# Patient Record
Sex: FEMALE | Race: WHITE | Employment: FULL TIME | URBAN - METROPOLITAN AREA
[De-identification: names, ages, dates, MRNs, and addresses within clinical notes are randomized per-mention and may not be internally consistent; named-entity substitution may affect disease eponyms.]

---

## 2019-10-10 LAB
HBV SURFACE AG SERPL QL IA: NEGATIVE
HIV 1+2 AB+HIV1 P24 AG SERPL QL IA: NEGATIVE
RUBELLA ABY IGG: NORMAL
TREPONEMA ANTIBODIES: NEGATIVE

## 2019-11-29 ENCOUNTER — HOSPITAL ENCOUNTER (INPATIENT)
Facility: CLINIC | Age: 34
LOS: 7 days | Discharge: HOME | End: 2019-12-06
Attending: OBSTETRICS & GYNECOLOGY | Admitting: OBSTETRICS & GYNECOLOGY
Payer: COMMERCIAL

## 2019-11-29 ENCOUNTER — APPOINTMENT (OUTPATIENT)
Dept: ULTRASOUND IMAGING | Facility: CLINIC | Age: 34
End: 2019-11-29
Attending: OBSTETRICS & GYNECOLOGY
Payer: COMMERCIAL

## 2019-11-29 PROBLEM — Z36.89 ENCOUNTER FOR TRIAGE IN PREGNANT PATIENT: Status: ACTIVE | Noted: 2019-11-29

## 2019-11-29 LAB
BASOPHILS # BLD AUTO: 0 10E9/L (ref 0–0.2)
BASOPHILS NFR BLD AUTO: 0.1 %
DIFFERENTIAL METHOD BLD: ABNORMAL
EOSINOPHIL # BLD AUTO: 0.2 10E9/L (ref 0–0.7)
EOSINOPHIL NFR BLD AUTO: 1.1 %
ERYTHROCYTE [DISTWIDTH] IN BLOOD BY AUTOMATED COUNT: 13.3 % (ref 10–15)
HCT VFR BLD AUTO: 37.1 % (ref 35–47)
HGB BLD-MCNC: 12.7 G/DL (ref 11.7–15.7)
IMM GRANULOCYTES # BLD: 0 10E9/L (ref 0–0.4)
IMM GRANULOCYTES NFR BLD: 0.3 %
LYMPHOCYTES # BLD AUTO: 2.8 10E9/L (ref 0.8–5.3)
LYMPHOCYTES NFR BLD AUTO: 20.9 %
MCH RBC QN AUTO: 31.9 PG (ref 26.5–33)
MCHC RBC AUTO-ENTMCNC: 34.2 G/DL (ref 31.5–36.5)
MCV RBC AUTO: 93 FL (ref 78–100)
MONOCYTES # BLD AUTO: 0.9 10E9/L (ref 0–1.3)
MONOCYTES NFR BLD AUTO: 6.6 %
NEUTROPHILS # BLD AUTO: 9.6 10E9/L (ref 1.6–8.3)
NEUTROPHILS NFR BLD AUTO: 71 %
NRBC # BLD AUTO: 0 10*3/UL
NRBC BLD AUTO-RTO: 0 /100
PLATELET # BLD AUTO: 129 10E9/L (ref 150–450)
RBC # BLD AUTO: 3.98 10E12/L (ref 3.8–5.2)
RUPTURE OF FETAL MEMBRANES BY ROM PLUS: POSITIVE
WBC # BLD AUTO: 13.6 10E9/L (ref 4–11)

## 2019-11-29 PROCEDURE — 86850 RBC ANTIBODY SCREEN: CPT | Performed by: OBSTETRICS & GYNECOLOGY

## 2019-11-29 PROCEDURE — 76815 OB US LIMITED FETUS(S): CPT

## 2019-11-29 PROCEDURE — 86901 BLOOD TYPING SEROLOGIC RH(D): CPT | Performed by: OBSTETRICS & GYNECOLOGY

## 2019-11-29 PROCEDURE — 25000128 H RX IP 250 OP 636

## 2019-11-29 PROCEDURE — 86870 RBC ANTIBODY IDENTIFICATION: CPT | Performed by: OBSTETRICS & GYNECOLOGY

## 2019-11-29 PROCEDURE — 85025 COMPLETE CBC W/AUTO DIFF WBC: CPT | Performed by: OBSTETRICS & GYNECOLOGY

## 2019-11-29 PROCEDURE — 84112 EVAL AMNIOTIC FLUID PROTEIN: CPT | Performed by: OBSTETRICS & GYNECOLOGY

## 2019-11-29 PROCEDURE — 86900 BLOOD TYPING SEROLOGIC ABO: CPT | Performed by: OBSTETRICS & GYNECOLOGY

## 2019-11-29 PROCEDURE — G0463 HOSPITAL OUTPT CLINIC VISIT: HCPCS

## 2019-11-29 PROCEDURE — 12000000 ZZH R&B MED SURG/OB

## 2019-11-29 PROCEDURE — 87653 STREP B DNA AMP PROBE: CPT | Performed by: OBSTETRICS & GYNECOLOGY

## 2019-11-29 PROCEDURE — 36415 COLL VENOUS BLD VENIPUNCTURE: CPT | Performed by: OBSTETRICS & GYNECOLOGY

## 2019-11-29 RX ORDER — ACETAMINOPHEN 325 MG/1
975 TABLET ORAL EVERY 4 HOURS PRN
Status: DISCONTINUED | OUTPATIENT
Start: 2019-11-29 | End: 2019-12-06 | Stop reason: HOSPADM

## 2019-11-29 RX ORDER — MAGNESIUM SULFATE IN WATER 40 MG/ML
1 INJECTION, SOLUTION INTRAVENOUS CONTINUOUS
Status: DISCONTINUED | OUTPATIENT
Start: 2019-11-29 | End: 2019-12-06 | Stop reason: HOSPADM

## 2019-11-29 RX ORDER — ASPIRIN 81 MG/1
81 TABLET, CHEWABLE ORAL DAILY
Status: ON HOLD | COMMUNITY
End: 2019-12-06

## 2019-11-29 RX ORDER — VITAMIN A ACETATE, .BETA.-CAROTENE, ASCORBIC ACID, CHOLECALCIFEROL, .ALPHA.-TOCOPHEROL ACETATE, DL-, THIAMINE MONONITRATE, RIBOFLAVIN, NIACINAMIDE, PYRIDOXINE HYDROCHLORIDE, FOLIC ACID, CYANOCOBALAMIN, CALCIUM CARBONATE, FERROUS FUMARATE, ZINC OXIDE, AND CUPRIC OXIDE 2000; 2000; 120; 400; 22; 1.84; 3; 20; 10; 1; 12; 200; 27; 25; 2 [IU]/1; [IU]/1; MG/1; [IU]/1; MG/1; MG/1; MG/1; MG/1; MG/1; MG/1; UG/1; MG/1; MG/1; MG/1; MG/1
1 TABLET ORAL DAILY
COMMUNITY

## 2019-11-29 RX ORDER — BETAMETHASONE SODIUM PHOSPHATE AND BETAMETHASONE ACETATE 3; 3 MG/ML; MG/ML
INJECTION, SUSPENSION INTRA-ARTICULAR; INTRALESIONAL; INTRAMUSCULAR; SOFT TISSUE
Status: COMPLETED
Start: 2019-11-29 | End: 2019-11-29

## 2019-11-29 RX ORDER — MAGNESIUM SULFATE HEPTAHYDRATE 40 MG/ML
4 INJECTION, SOLUTION INTRAVENOUS ONCE
Status: COMPLETED | OUTPATIENT
Start: 2019-11-29 | End: 2019-11-30

## 2019-11-29 RX ORDER — ONDANSETRON 2 MG/ML
4 INJECTION INTRAMUSCULAR; INTRAVENOUS EVERY 6 HOURS PRN
Status: DISCONTINUED | OUTPATIENT
Start: 2019-11-29 | End: 2019-12-06 | Stop reason: HOSPADM

## 2019-11-29 RX ORDER — BETAMETHASONE SODIUM PHOSPHATE AND BETAMETHASONE ACETATE 3; 3 MG/ML; MG/ML
12 INJECTION, SUSPENSION INTRA-ARTICULAR; INTRALESIONAL; INTRAMUSCULAR; SOFT TISSUE EVERY 24 HOURS
Status: COMPLETED | OUTPATIENT
Start: 2019-11-29 | End: 2019-11-30

## 2019-11-29 RX ORDER — AMOXICILLIN 250 MG
2 CAPSULE ORAL 2 TIMES DAILY
Status: DISCONTINUED | OUTPATIENT
Start: 2019-11-29 | End: 2019-12-06 | Stop reason: HOSPADM

## 2019-11-29 RX ORDER — AMPICILLIN 2 G/1
2 INJECTION, POWDER, FOR SOLUTION INTRAVENOUS EVERY 6 HOURS
Status: COMPLETED | OUTPATIENT
Start: 2019-11-29 | End: 2019-12-01

## 2019-11-29 RX ORDER — AZITHROMYCIN 250 MG/1
1000 TABLET, FILM COATED ORAL ONCE
Status: COMPLETED | OUTPATIENT
Start: 2019-11-29 | End: 2019-11-30

## 2019-11-29 RX ORDER — CALCIUM GLUCONATE 94 MG/ML
1 INJECTION, SOLUTION INTRAVENOUS
Status: DISCONTINUED | OUTPATIENT
Start: 2019-11-29 | End: 2019-12-06 | Stop reason: HOSPADM

## 2019-11-29 RX ORDER — AMOXICILLIN 250 MG
1 CAPSULE ORAL 2 TIMES DAILY
Status: DISCONTINUED | OUTPATIENT
Start: 2019-11-29 | End: 2019-12-06 | Stop reason: HOSPADM

## 2019-11-29 RX ORDER — SODIUM CHLORIDE, SODIUM LACTATE, POTASSIUM CHLORIDE, CALCIUM CHLORIDE 600; 310; 30; 20 MG/100ML; MG/100ML; MG/100ML; MG/100ML
INJECTION, SOLUTION INTRAVENOUS CONTINUOUS
Status: DISCONTINUED | OUTPATIENT
Start: 2019-11-29 | End: 2019-12-06 | Stop reason: HOSPADM

## 2019-11-29 RX ORDER — AMOXICILLIN 250 MG/1
250 CAPSULE ORAL 3 TIMES DAILY
Status: DISCONTINUED | OUTPATIENT
Start: 2019-12-01 | End: 2019-12-04

## 2019-11-29 RX ADMIN — BETAMETHASONE SODIUM PHOSPHATE AND BETAMETHASONE ACETATE 12 MG: 3; 3 INJECTION, SUSPENSION INTRA-ARTICULAR; INTRALESIONAL; INTRAMUSCULAR at 23:27

## 2019-11-29 ASSESSMENT — ACTIVITIES OF DAILY LIVING (ADL)
AMBULATION: 0-->INDEPENDENT
BATHING: 0-->INDEPENDENT
TOILETING: 0-->INDEPENDENT
SWALLOWING: 0-->SWALLOWS FOODS/LIQUIDS WITHOUT DIFFICULTY
TRANSFERRING: 0-->INDEPENDENT
RETIRED_COMMUNICATION: 0-->UNDERSTANDS/COMMUNICATES WITHOUT DIFFICULTY
RETIRED_EATING: 0-->INDEPENDENT
DRESS: 0-->INDEPENDENT
FALL_HISTORY_WITHIN_LAST_SIX_MONTHS: NO
COGNITION: 0 - NO COGNITION ISSUES REPORTED

## 2019-11-29 ASSESSMENT — MIFFLIN-ST. JEOR: SCORE: 1762.3

## 2019-11-30 LAB
ABO + RH BLD: ABNORMAL
ABO + RH BLD: ABNORMAL
ALBUMIN UR-MCNC: NEGATIVE MG/DL
AMPHETAMINES UR QL SCN: NEGATIVE
APPEARANCE UR: CLEAR
BILIRUB UR QL STRIP: NEGATIVE
BLD GP AB INVEST PLASRBC-IMP: ABNORMAL
BLD GP AB SCN SERPL QL: ABNORMAL
BLOOD BANK CMNT PATIENT-IMP: ABNORMAL
CANNABINOIDS UR QL: NEGATIVE
COCAINE UR QL: NEGATIVE
COLOR UR AUTO: ABNORMAL
GLUCOSE UR STRIP-MCNC: NEGATIVE MG/DL
HGB UR QL STRIP: ABNORMAL
KETONES UR STRIP-MCNC: 40 MG/DL
LEUKOCYTE ESTERASE UR QL STRIP: ABNORMAL
MUCOUS THREADS #/AREA URNS LPF: PRESENT /LPF
NITRATE UR QL: NEGATIVE
OPIATES UR QL SCN: NEGATIVE
PCP UR QL SCN: NEGATIVE
PH UR STRIP: 6 PH (ref 5–7)
RBC #/AREA URNS AUTO: 28 /HPF (ref 0–2)
SOURCE: ABNORMAL
SP GR UR STRIP: 1.01 (ref 1–1.03)
SPECIMEN EXP DATE BLD: ABNORMAL
SQUAMOUS #/AREA URNS AUTO: 1 /HPF (ref 0–1)
UROBILINOGEN UR STRIP-MCNC: NORMAL MG/DL (ref 0–2)
WBC #/AREA URNS AUTO: 20 /HPF (ref 0–5)

## 2019-11-30 PROCEDURE — 25000128 H RX IP 250 OP 636: Performed by: OBSTETRICS & GYNECOLOGY

## 2019-11-30 PROCEDURE — 25800030 ZZH RX IP 258 OP 636: Performed by: OBSTETRICS & GYNECOLOGY

## 2019-11-30 PROCEDURE — 81001 URINALYSIS AUTO W/SCOPE: CPT | Performed by: OBSTETRICS & GYNECOLOGY

## 2019-11-30 PROCEDURE — 25000132 ZZH RX MED GY IP 250 OP 250 PS 637: Performed by: OBSTETRICS & GYNECOLOGY

## 2019-11-30 PROCEDURE — 12000000 ZZH R&B MED SURG/OB

## 2019-11-30 PROCEDURE — 87086 URINE CULTURE/COLONY COUNT: CPT | Performed by: OBSTETRICS & GYNECOLOGY

## 2019-11-30 PROCEDURE — 80307 DRUG TEST PRSMV CHEM ANLYZR: CPT | Performed by: OBSTETRICS & GYNECOLOGY

## 2019-11-30 RX ADMIN — AMPICILLIN SODIUM 2 G: 2 INJECTION, POWDER, FOR SOLUTION INTRAVENOUS at 00:19

## 2019-11-30 RX ADMIN — ACETAMINOPHEN 975 MG: 325 TABLET, FILM COATED ORAL at 10:20

## 2019-11-30 RX ADMIN — AMPICILLIN SODIUM 2 G: 2 INJECTION, POWDER, FOR SOLUTION INTRAVENOUS at 23:51

## 2019-11-30 RX ADMIN — AMPICILLIN SODIUM 2 G: 2 INJECTION, POWDER, FOR SOLUTION INTRAVENOUS at 05:58

## 2019-11-30 RX ADMIN — AMPICILLIN SODIUM 2 G: 2 INJECTION, POWDER, FOR SOLUTION INTRAVENOUS at 18:02

## 2019-11-30 RX ADMIN — MAGNESIUM SULFATE IN WATER 2 G/HR: 40 INJECTION, SOLUTION INTRAVENOUS at 11:12

## 2019-11-30 RX ADMIN — MAGNESIUM SULFATE IN WATER 2 G/HR: 40 INJECTION, SOLUTION INTRAVENOUS at 20:50

## 2019-11-30 RX ADMIN — SODIUM CHLORIDE, POTASSIUM CHLORIDE, SODIUM LACTATE AND CALCIUM CHLORIDE: 600; 310; 30; 20 INJECTION, SOLUTION INTRAVENOUS at 14:04

## 2019-11-30 RX ADMIN — MAGNESIUM SULFATE IN WATER 2 G/HR: 40 INJECTION, SOLUTION INTRAVENOUS at 01:04

## 2019-11-30 RX ADMIN — BETAMETHASONE SODIUM PHOSPHATE AND BETAMETHASONE ACETATE 6 MG: 3; 3 INJECTION, SUSPENSION INTRA-ARTICULAR; INTRALESIONAL; INTRAMUSCULAR at 23:50

## 2019-11-30 RX ADMIN — ONDANSETRON 4 MG: 2 INJECTION INTRAMUSCULAR; INTRAVENOUS at 18:06

## 2019-11-30 RX ADMIN — SODIUM CHLORIDE, POTASSIUM CHLORIDE, SODIUM LACTATE AND CALCIUM CHLORIDE: 600; 310; 30; 20 INJECTION, SOLUTION INTRAVENOUS at 00:18

## 2019-11-30 RX ADMIN — SENNOSIDES AND DOCUSATE SODIUM 1 TABLET: 8.6; 5 TABLET ORAL at 10:20

## 2019-11-30 RX ADMIN — AZITHROMYCIN MONOHYDRATE 1000 MG: 250 TABLET ORAL at 00:24

## 2019-11-30 RX ADMIN — AMPICILLIN SODIUM 2 G: 2 INJECTION, POWDER, FOR SOLUTION INTRAVENOUS at 11:59

## 2019-11-30 RX ADMIN — MAGNESIUM SULFATE HEPTAHYDRATE 4 G: 40 INJECTION, SOLUTION INTRAVENOUS at 00:35

## 2019-11-30 NOTE — PLAN OF CARE
Patient states she feels some  cramping most not picked up on monitor. VS stable on Mag Sulfate for neuro protection, On IVAB for PPROM protocol. PCDs on , Patient in good spirits. Lungs clear. Occ c/o headache, had a dose of tylenol this AM. Continue plan of care.

## 2019-11-30 NOTE — PROVIDER NOTIFICATION
11/30/19 1400   Comments   Comments Patient states she has some right side pain like she did with a previous ovarian cyst.

## 2019-11-30 NOTE — PROVIDER NOTIFICATION
11/29/19 0750   Provider Notification   Provider Name/Title Dr. Hood   Method of Notification Electronic Page   Notification Reason SVE;Other (Comment)  (ultrasound position cephalic)       Electronic page per Dr. Hood request with results from Ultrasound for presentation and SVE.  No response expected.

## 2019-11-30 NOTE — PROVIDER NOTIFICATION
11/29/19 1755   Provider Notification   Provider Name/Title Dr. Hood   Method of Notification Phone   Request Evaluate - Remote   Notification Reason Lab/Diagnostic Study;Status Update              Dr. Hood informed of but not limited to patient prenatal/medical history, ROMplus positive, difficulty with monitoring, occasional cramping. Plan per provider is admit to labor and delivery.  Give Betamethasone, Magnesium for neuro protection, Antibiotics for PPROM, GBS swab, US for presentation, and SVE. Patient verbalized agreement with plan.

## 2019-11-30 NOTE — PROVIDER NOTIFICATION
11/30/19 1230   Comments   Comments Patient stated she is feeling continual rectal and vaginal pressure, SVE done prior to letting patient get up to bathroom.

## 2019-11-30 NOTE — H&P
"  2019    Dasha Rebolledo  [id number]    OB Admit History & Physical      Ms. Rebolledo  is here with leakage of fluid    She noticed a big gush of fluid yesterday evening around 8:30 PM.    No LMP recorded. Patient is pregnant.   Her Estimated Date of Delivery: 2020, making her 32w5d  wks.      Estimated body mass index is 42.69 kg/m  as calculated from the following:    Height as of this encounter: 1.6 m (5' 3\").    Weight as of this encounter: 109.3 kg (241 lb).     Dasha is a 35 y/o  at 32w5d gestation by 27w3d ultrasound, who presented to L&D yesterday evening due to multiple large gushes of clear fluid beginning around 8:30 PM. She also complains of mild cramping. She denies vaginal bleeding (aside from a slight pink tinge in the fluid.)    She is visiting from [place] for the holiday weekend.    Her prenatal course has been complicated by:     -Late prenatal care at 27 weeks gestation (she didn't realize she was pregnant until ~25 weeks)   -A history of PPROM/PTL at 33 weeks with her first pregnancy in . This was a forceps assisted vaginal delivery. She states she pushed for 12 hours though the details are unclear.   -D&E at 23 weeks gestation for her second pregnancy in  for severe fetal anomalies (anhydramnios and polycystic vs dysplastic kidneys).      -She also reports a history of thrombocytopenia of unclear etiology, for which her platelets have been trended during her pregnancy.     -Maternal obesity (BMI 42).    -History of anxiety/depression for which she stopped taking sertraline in Aug 2019 and reports her mood is stable    See scanned outside records for prenatals    She had a normal fetal anatomy scan/ level II US at 27w3d gestation    GBS unknown, Rh negative, rubella immune. She received Rhogam on 19.    She is a 34 year old   Her OB history:   OB History    Para Term  AB Living   3 1 0 1 1 1   SAB TAB Ectopic Multiple Live Births   0 1 0 0 1    "   # Outcome Date GA Lbr Jason/2nd Weight Sex Delivery Anes PTL Lv   3 Current            2          JANNIE   1 TAB                 Past Medical History:   Diagnosis Date     Depressive disorder      Migraines        Past Surgical History:   Procedure Laterality Date     AS REMOVAL OF TONSILS,12+ Y/O       DILATION AND CURETTAGE         No current outpatient medications on file.       Allergies: Venlafaxine      REVIEW OF SYSTEMS:  NEUROLOGIC:  Negative  EYES:  Negative  ENT:  Negative  GI:  Negative  BREAST:  Negative  :  Negative  GYN:  Negative  CV:  Negative  PULMONARY:  Negative  MUSCULOSKELETAL:  Negative  PSYCH:  Negative        Social History     Socioeconomic History     Marital status:      Spouse name: Not on file     Number of children: Not on file     Years of education: Not on file     Highest education level: Not on file   Occupational History     Not on file   Social Needs     Financial resource strain: Not on file     Food insecurity:     Worry: Not on file     Inability: Not on file     Transportation needs:     Medical: Not on file     Non-medical: Not on file   Tobacco Use     Smoking status: Never Smoker     Smokeless tobacco: Never Used   Substance and Sexual Activity     Alcohol use: Not Currently     Drug use: Never     Sexual activity: Yes     Partners: Male     Birth control/protection: None   Lifestyle     Physical activity:     Days per week: Not on file     Minutes per session: Not on file     Stress: Not on file   Relationships     Social connections:     Talks on phone: Not on file     Gets together: Not on file     Attends Jewish service: Not on file     Active member of club or organization: Not on file     Attends meetings of clubs or organizations: Not on file     Relationship status: Not on file     Intimate partner violence:     Fear of current or ex partner: Not on file     Emotionally abused: Not on file     Physically abused: Not on file     Forced sexual  activity: Not on file   Other Topics Concern     Not on file   Social History Narrative     Not on file      History reviewed. No pertinent family history.      Vitals:     Vitals:    11/30/19 0745 11/30/19 0800 11/30/19 0830 11/30/19 0900   BP: 134/76      Resp:       Temp: 98.3  F (36.8  C)      TempSrc: Oral      SpO2: 100% 100% 100% 100%   Weight:       Height:           /mod/+accels/no decels  [place]: not tracing any contractions  SVE: on admission: 1/50/-3    Alert Awake in NAD  ABD gravid, obese, non-tender  Pelvic: Deferred at this time  Ext: NT/NE  Bedside ultrasound on admission confirmed vertex presentation    Component      Latest Ref Rng & Units 11/29/2019 11/30/2019   WBC      4.0 - 11.0 10e9/L 13.6 (H)    RBC Count      3.8 - 5.2 10e12/L 3.98    Hemoglobin      11.7 - 15.7 g/dL 12.7    Hematocrit      35.0 - 47.0 % 37.1    MCV      78 - 100 fl 93    MCH      26.5 - 33.0 pg 31.9    MCHC      31.5 - 36.5 g/dL 34.2    RDW      10.0 - 15.0 % 13.3    Platelet Count      150 - 450 10e9/L 129 (L)    Diff Method       Automated Method    % Neutrophils      % 71.0    % Lymphocytes      % 20.9    % Monocytes      % 6.6    % Eosinophils      % 1.1    % Basophils      % 0.1    % Immature Granulocytes      % 0.3    Nucleated RBCs      0 /100 0    Absolute Neutrophil      1.6 - 8.3 10e9/L 9.6 (H)    Absolute Lymphocytes      0.8 - 5.3 10e9/L 2.8    Absolute Monocytes      0.0 - 1.3 10e9/L 0.9    Absolute Eosinophils      0.0 - 0.7 10e9/L 0.2    Absolute Basophils      0.0 - 0.2 10e9/L 0.0    Abs Immature Granulocytes      0 - 0.4 10e9/L 0.0    Absolute Nucleated RBC       0.0    Amphetamine Qual Urine      NEG:Negative  Negative   Cannabinoids Qual Urine      NEG:Negative  Negative   Cocaine Qual Urine      NEG:Negative  Negative   Opiates Qualitative Urine      NEG:Negative  Negative   Pcp Qual Urine      NEG:Negative  Negative   Rupture of Fetal Membranes by ROM Plus      NEG:Negative Positive (A)         A/P: 35 y/o  at 32w5d by 27w3d US, admitted with PPROM    Fetal well being - reassuring fetal monitoring at this time  Will plan for continuous monitoring for first 24 hours of admission, then can switch to TID if remains reassuring  -BMTZ for fetal lung maturity - 1st dose at  at 23:30, second dose due tonight    PPROM  -On latency antibiotics     -Received 1 dose of azithromycin, currently on IV ampicillin     -Continue antibiotics per protocol  -Monitor for signs of labor or infection  -Plan for delivery at 34 weeks or sooner if indicated  -GBS pending  -UA pending  -Urine drug screen negative  -On magnesium for tocolysis while administering BTMZ  -NICU consult    Thrombocytopenia - platelets 129 on admission, stable from prior (were last 128 per patient report)    Rh negative - received Rhogam on . Rubella immune.    Pain - will want epidural when laboring    Dispo - admit to L&D until delivery      Priti Wu MD  Dept of OB/GYN  2019

## 2019-11-30 NOTE — PROVIDER NOTIFICATION
11/30/19 0700   Uterine Activity Assessment   Method external tocotransducer   Contraction Frequency (Minutes) none   Contraction Intensity Patient reports cramping   Uterine Resting Tone soft by palpation       Patient reports cramping fairly continuous for the last 15 minutes, rating it a 2-3 or like a heavy menstrual cramp

## 2019-11-30 NOTE — PLAN OF CARE
Data: [facility] to [facility] at [facility]. Pt is visiting family in [place] lives in [place].  Provider Dr. Mcnally at [facility] in [facility].  Reason for maternal/fetal assessment per patient is Fluid Leak  Patient is a . Prenatal record reviewed.   Gestational Age 32w4d. VSS. Fetal movement present. Patient denies  backache, vaginal discharge, pelvic pressure, UTI symptoms, GI problems, bloody show, vaginal bleeding, edema, headache, visual disturbances, epigastric or URQ pain, abdominal pain.  Rupture of membranes at 2030 clear fluid.  Patient noting some cramping.  Pt has history of termination at 23 weeks and delivery at 33 weeks due to PPROM.  Late prenatal care this pregnancy.  Support family present.  Action: Verbal consent for EFM. Triage assessment completed. EFM applied for fetal wellbeing during fluid leak. Uterine assessment soft and non tender to touch. ROMplus collected and sent to lab.  RICHARDSON sent to [facility] for record of prenatal history.  Response: Awaiting ROMplus results to update provider for walk-ins

## 2019-11-30 NOTE — PLAN OF CARE
Patient started on antibiotics for prom and Mag for neuro protection.  Patient reports some mild cramping in lower abdomen to back about every 10-15 minutes.  Abdomen palpates soft and RN could not detect any tightening while palpating during cramping.  Fluid continues to leak, pink tinged. Patient remains afebrile throughout evening.

## 2019-12-01 LAB
BACTERIA SPEC CULT: NO GROWTH
GP B STREP DNA SPEC QL NAA+PROBE: NEGATIVE
Lab: NORMAL
MAGNESIUM SERPL-MCNC: 5.6 MG/DL (ref 1.6–2.3)
SPECIMEN SOURCE: NORMAL
SPECIMEN SOURCE: NORMAL

## 2019-12-01 PROCEDURE — 12000000 ZZH R&B MED SURG/OB

## 2019-12-01 PROCEDURE — 36415 COLL VENOUS BLD VENIPUNCTURE: CPT | Performed by: OBSTETRICS & GYNECOLOGY

## 2019-12-01 PROCEDURE — 25000132 ZZH RX MED GY IP 250 OP 250 PS 637: Performed by: OBSTETRICS & GYNECOLOGY

## 2019-12-01 PROCEDURE — 25800030 ZZH RX IP 258 OP 636: Performed by: OBSTETRICS & GYNECOLOGY

## 2019-12-01 PROCEDURE — 25000128 H RX IP 250 OP 636: Performed by: OBSTETRICS & GYNECOLOGY

## 2019-12-01 PROCEDURE — 83735 ASSAY OF MAGNESIUM: CPT | Performed by: OBSTETRICS & GYNECOLOGY

## 2019-12-01 RX ADMIN — ACETAMINOPHEN 975 MG: 325 TABLET, FILM COATED ORAL at 17:29

## 2019-12-01 RX ADMIN — SODIUM CHLORIDE, POTASSIUM CHLORIDE, SODIUM LACTATE AND CALCIUM CHLORIDE: 600; 310; 30; 20 INJECTION, SOLUTION INTRAVENOUS at 02:17

## 2019-12-01 RX ADMIN — ONDANSETRON 4 MG: 2 INJECTION INTRAMUSCULAR; INTRAVENOUS at 19:26

## 2019-12-01 RX ADMIN — SENNOSIDES AND DOCUSATE SODIUM 1 TABLET: 8.6; 5 TABLET ORAL at 20:53

## 2019-12-01 RX ADMIN — AMPICILLIN SODIUM 2 G: 2 INJECTION, POWDER, FOR SOLUTION INTRAVENOUS at 06:18

## 2019-12-01 RX ADMIN — SODIUM CHLORIDE, POTASSIUM CHLORIDE, SODIUM LACTATE AND CALCIUM CHLORIDE: 600; 310; 30; 20 INJECTION, SOLUTION INTRAVENOUS at 11:58

## 2019-12-01 RX ADMIN — MAGNESIUM SULFATE IN WATER 2 G/HR: 40 INJECTION, SOLUTION INTRAVENOUS at 06:18

## 2019-12-01 RX ADMIN — SENNOSIDES AND DOCUSATE SODIUM 1 TABLET: 8.6; 5 TABLET ORAL at 09:27

## 2019-12-01 RX ADMIN — AMOXICILLIN 250 MG: 250 CAPSULE ORAL at 23:32

## 2019-12-01 RX ADMIN — AMPICILLIN SODIUM 2 G: 2 INJECTION, POWDER, FOR SOLUTION INTRAVENOUS at 11:58

## 2019-12-01 RX ADMIN — AMPICILLIN SODIUM 2 G: 2 INJECTION, POWDER, FOR SOLUTION INTRAVENOUS at 18:10

## 2019-12-01 NOTE — PLAN OF CARE
"Pt reports uterine cramping is unchanged from previous, reports intermittent cramping every 15-20 minutes or so. Reports + fetal movement, and reports continuous \"flows\" of fluid. Clear fluid noted to pad. Pt agrees to alert RN if changes noted to cramping, frequency or intensity. Plan to resume TID monitoring in morning if unchanged throughout night. Will continue to monitor.  "

## 2019-12-01 NOTE — PLAN OF CARE
Afebrile , C/O shortness of breath this AM, mag level drawn, Mag decreased to 1g hour per MD order. Shortness of breath has resolved. Continues to leak small amounts of fluid, no vaginal bleeding. Cramping a small amt off and on this shift.  with moderate variability and accels. Mag to be discontinued tonight. Patient has 1 more IVAB dose prior to switching to po antibiotics. Plan for [facility] BPP and consult tomorrow.

## 2019-12-01 NOTE — PROGRESS NOTES
"OB  Patient is 33 yo  @ 32 6/7 weeks with PPROM since . She is comfortable this am-noting some chest pressure that has improved since decreasing Mg. Patient has now received 2 doses BMZ, last dose last night 1130pm.   BP (!) 141/73   Pulse 90   Temp 97.6  F (36.4  C) (Temporal)   Resp 18   Ht 1.6 m (5' 3\")   Wt 109.3 kg (241 lb)   SpO2 97%   BMI 42.69 kg/m       Cx exam deferred  NST reassuring for gestational age    A/P: 33 yo  @ 32 6/7 weeks, PPROM  Continue magnesium sulfate until tonight after 24 hours BMZ completed.   Continue current antibiotics. GBS is negative.  Plan for ultrasound tomorrow for growth and BPP with MFM consult.  Patient stating that she has history of low platelets with previous pregnancies, will monitor every few days.   Reviewed with patient that she will remain hospitalized until delivery. Will monitor for signs/sx of chorio and/or labor.   Consider induction of labor at 34 weeks if undelivered.     Nishi Hood MD, MD on 2019 at 10:22 AM  "

## 2019-12-01 NOTE — PLAN OF CARE
Pt reports being able to sleep since last trip to bathroom. Denies cramping, reports continued leaking with pink tinge noted to pad. Pt agrees to vital signs after returning from restroom, plans for continued rest. Will continue to monitor.

## 2019-12-01 NOTE — PLAN OF CARE
Patient c/o feeling short of breath, Patient in a high fowlers position working on computer. Denies headache, blurred vision, etc. Reflexes +2, neg clonus. Heart rate regular, lungs sound clear. Magnesium level ordered.

## 2019-12-01 NOTE — PLAN OF CARE
VSS and afebrile throughout night. Pt reports cramping throughout night that is unchanged, lower abdominal cramping rated 2/10 on pain scale. Reports 1 contraction between hours of [id number] that awoke her from sleep but that it was a single contraction. NST performed, FHR baseline 115 with moderate variability, accels and no decels. Pt reports being able to rest and sleep between bathroom trips. +fetal movement felt throughout night. Continues to leak clear with pink tinged fluid. Voiding every 1-3 hours. Will continue to monitor.

## 2019-12-01 NOTE — PLAN OF CARE
Report received from JACEK Lea RN. Pt care assumed. POC discussed with RN and pt at bedside, pt declines having further questions.

## 2019-12-02 ENCOUNTER — HOSPITAL ENCOUNTER (INPATIENT)
Dept: ULTRASOUND IMAGING | Facility: CLINIC | Age: 34
End: 2019-12-02
Attending: OBSTETRICS & GYNECOLOGY
Payer: COMMERCIAL

## 2019-12-02 PROCEDURE — 76811 OB US DETAILED SNGL FETUS: CPT

## 2019-12-02 PROCEDURE — 25000132 ZZH RX MED GY IP 250 OP 250 PS 637: Performed by: OBSTETRICS & GYNECOLOGY

## 2019-12-02 PROCEDURE — 76819 FETAL BIOPHYS PROFIL W/O NST: CPT | Performed by: OBSTETRICS & GYNECOLOGY

## 2019-12-02 PROCEDURE — 12000000 ZZH R&B MED SURG/OB

## 2019-12-02 RX ADMIN — ACETAMINOPHEN 975 MG: 325 TABLET, FILM COATED ORAL at 08:43

## 2019-12-02 RX ADMIN — AMOXICILLIN 250 MG: 250 CAPSULE ORAL at 21:18

## 2019-12-02 RX ADMIN — AMOXICILLIN 250 MG: 250 CAPSULE ORAL at 16:01

## 2019-12-02 RX ADMIN — AMOXICILLIN 250 MG: 250 CAPSULE ORAL at 08:44

## 2019-12-02 RX ADMIN — SENNOSIDES AND DOCUSATE SODIUM 1 TABLET: 8.6; 5 TABLET ORAL at 21:18

## 2019-12-02 RX ADMIN — SENNOSIDES AND DOCUSATE SODIUM 1 TABLET: 8.6; 5 TABLET ORAL at 08:44

## 2019-12-02 ASSESSMENT — MIFFLIN-ST. JEOR: SCORE: 1752.32

## 2019-12-02 NOTE — PLAN OF CARE
Pt afebrile. Denies contractions. Still leaking occasionally. Pt stated she had a headache. 975mg Tylenol given. Not much relief, so Pt given ice pack. Last dose of IV antibiotics. See MAR for details.

## 2019-12-02 NOTE — PLAN OF CARE
VS stable, Patient continues to leak clear to light pinkish amniotic fluid. Now off Mag. Has had 2 doses of betamethasone. BPP and consult per M, still plan to delivery at 34 weeks unless goes into labor on own. Patient afebrile, FHR with moderate variability and accels. No decels noted.

## 2019-12-02 NOTE — CONSULTS
"RE: Dasha Rebolledo  : 1985  MRUN: [id number]    2019    Dear Erwin Rizvi,    Thank you for referring your patient Ms. Rebolledo for a Maternal-Fetal Medicine consultation today regarding PPROM.  As you know, she is a 34 year old  at 33 weeks and 0 days gestation with an estimated date of delivery of 2020 by 27 week ultrasound.    She is from the [facility] and is here in [place] visiting family for Denis.  She reports that this pregnancy was uncomplicated until three days ago (the day after Justus) when she thought she urinated on herself and then started gushing fluid.  She said it was \"like a movie.\"  She had some pink discharge, but no bleeding.   She presented to [facility] and was diagnosed with PPROM via laboratory testing.  Since being at [facility] she was administered betamethasone and is receiving latency antibiotics.  She does report that her one hour GCT was abnormal, she thinks it may have been 180, but she has not done a three hour GTT.      Today she feels well.  She denies contractions and vaginal bleeding.  She reports good fetal movement.   She denies fevers and chills.  Only clear leakage.      Of note, she has a history of PPROM in her 2012 pregnancy and presented too late in this pregnancy to start 17-OHP.  She is unsure of the exact gestational age at that delivery.  She says that her due date kept changing.  At first it was 3, then it changed to 3 and then she was told 3/23.  Her delivery date was , which would make her 35 & 2/7 by the first АННА, 36 & 4 by the second АННА and 34 & 3 by the third АННА.  Her son weighed 6 lbs 6 oz and only stayed in the hospital for four days.  She has irregular cycles, only getting a few periods a year.  I assume the first АННА was by her LMP and the second АННА would be the most accurate, so she was likely 36 & 4.  She was 1 cm on admission and soon after was 8 cm dilated.  She delivered within 12 hours of " being at the hospital, but did have maternal exhaustion (and what sounds like a prolonged second stage) in the second stage and had a forceps-assisted delivery.  This pregnancy she did have mild gestational thrombocytopenia, but denies thrombocytopenia outside of pregnancy.  She was able to get an epidural without issue.  She denies a cervical laceration or issue related to the forceps.      She also has a history of a D&E in 2016 around 23 weeks in the setting of anhydramnios and a suspected renal anomaly.  She reports that testing for polycystic kidney disease was negative.  Further records are not available.      Obstetrical History:    # Outcome Date GA Lbr Jason/2nd Weight Sex Delivery Anes PTL Lv   3 Current            2 TAB 2018 23w0d             Complications: Anhydramnios, Fetal renal anomaly, single gestation   1  12 36w4d  2.892 kg (6 lb 6 oz) M Vag-Forceps EPI  JANNIE      Complications: History of  premature rupture of membranes (PPROM), Maternal exhaustion complicating labor and delivery     Gynecological History:    Menarche: Irregular cycles, only every few months    She denies a history of sexually transmitted infections or abnormal Pap tests.    Medical History:   Diagnosis Date     Anxiety      Depressive disorder - previously on sertraline, currently on nothing      Migraines - previously on topiramate, currently on nothing      Obesity      Thrombocytopenia - gestational      Surgical History:   Procedure Laterality Date     AS REMOVAL OF TONSILS,12+ Y/O       DILATION AND EVACUATION  2016     FOOT SURGERY Right     removal of cyst     Medications:      acetaminophen (TYLENOL) tablet 975 mg, 975 mg, Oral, Q4H PRN, 975 mg at 19 0843     amoxicillin (AMOXIL) capsule 250 mg, 250 mg, Oral, TID, 250 mg at 19 1601     lactated ringers infusion, , Intravenous, Continuous, Last Rate: 100 mL/hr at 19 2033     ondansetron (ZOFRAN) injection 4 mg, 4 mg, Intravenous, Q6H  PRN, 4 mg at 19 192     senna-docusate (SENOKOT-S/PERICOLACE) 8.6-50 MG per tablet 1 tablet, 1 tablet, Oral, BID, 1 tablet at 19 0844 **OR** senna-docusate (SENOKOT-S/PERICOLACE) 8.6-50 MG per tablet 2 tablet, 2 tablet, Oral, BID    Allergies:   Allergen Reactions     Venlafaxine Itching     Social History:    She  reports that she has never smoked. She has never used smokeless tobacco. She reports previous alcohol use. She reports that she does not use drugs.    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       Partner History:    She denies that he has a family history of motor/intellectual impairment, stillbirth, genetic or chromosome abnormalities or congenital anomalies.       Review of Systems:    Negative except as per the HPI    Data Reviewed:      Pre-pregnancy - Height 5 feet 3 inches; Weight: 254 lbs; BMI:45 kg/m2    2019  o ABO Group: O, Rh type: neg, antibody screen: positive (thought to be passive due to Rh immune globulin)  o WBC 13.6  thou/ L, Hemoglobin 12.7 mg/dL, hematocrit 37.1 %, MCV 93 fL, platelets 129 thou/ L  o ROM plus: positive  o GBS: negative  o Urine toxicology: negative  o Urine culture: no growth    The remaining prenatal laboratory results are not available for the review during the consultation.    Ultrasound:    Please see imaging tab for [facility] ultrasound performed today    Physical examination was deferred at this time.    In light of the patient s history as listed above my recommendations can be summarized briefly as follows:    Current PPROM    Today we discussed the diagnosis and various etiologies of PPROM, its potential complications and the recommended management.  A number of etiologies lead to  premature rupture of the membranes and   birth, including but not limited to infection, bleeding, uterine distension, cervical insufficiency and stress.   However, most spontaneous  deliveries occur in patients with no risk factors.  A history of prior  delivery is a significant risk factor for recurrence in a subsequent pregnancy.  Recurrent  delivery has been linked to maternal ethnicity, genitourinary infection, and especially gestational age at the first  delivery: the earlier the delivery, the greater the likelihood of recurrence.  Tobacco is a significant risk factor for PPROM/PTB.  Dasha's risk factor was her prior  birth.  We reviewed that the complications of PPROM include spontaneous  labor/birth, development (or presence of) chorioamnionitis, also referred to as intra-amniotic infection (IAI), placental abruption and cord prolapse.  We discussed the increased frequency of malpresentation and abnormal fetal testing.  We reviewed that, for all of the above reasons, in patient hospitalization until delivery is recommended.  We discussed some of the risks of prematurity.   We discussed that in the absence of evidence of an IAI, or non-reassuring fetal or maternal status, the goal for delivery is 34 weeks as this is the gestational age where it is thought that the risk of infection (in prolonged pregnancies) outweighs the risk of prematurity.  We discussed that development of IAI is an indication for immediate delivery irrespective of gestational age because of the high rate of maternal and fetal/ complications with prolonged pregnancy and the lack of available treatment.  We discussed that abruption can be insidious or acute and may prompt immediate delivery.  We also discussed that cord prolapse is a surgical emergency.  We discussed the role of latency antibiotics to prolong pregnancy.  We discussed the benefits of steroid administration in reducing some of the risks of prematurity. The  indication for neuroprotection magnesium sulfate in pregnancies is only for those at risk for delivery at <32 weeks, she is beyond this gestational age and therefore this is nor indicated.     Recommendations:    Hospitalization until delivery    Goal delivery at 34 weeks in the absence of indications for earlier delivery as outlined above    Latency antibiotics with IV ampicillin and erythromycin (or ampicillin and PO azithromycin) followed by PO amoxicillin for a total of 7 days    Immediate delivery is indicated for concern for chorioamnionitis, with administration of broad spectrum antibiotics (often ampicillin and gentamycin until delivery with addition of clindamycin if the patient requires ).  Treatment for 24 hours postoperative is recommended post- assuming the patient remains afebrile    Serial fetal monitoring with NST and contraction monitoring    We will perform another BPP in three days    Avoidance of digital cervical exams as much as possible in the setting of ruptured membranes to prevent infection    PPROM is not an indication for  per say and mode of delivery is per obstetric indication, would recheck presentation prior to induction    Neonatology and anesthesiology consultation    In the next pregnancy, the patient is a candidate for 17-hydroxyprogesterone caproate 250mg IM weekly from 16-36 weeks  gestation as well as transvaginal cervical length at 16-18 weeks  gestation with measurements every 2 weeks until 24 weeks, although she notes she is not planning any more children and is planning surgical sterilization for her or her partner    Discontinue IVF    Repeat platelet count today (has been three days).  If stable can recheck on day of delivery.  If decreasing it is reasonable to repeat again in three days    All women with a BMI of 30 or greater should be considered for pharmacologic thromboprophylaxis if they have an expected antepartum stay of 72 hours or more.    She should also have pharmacologic thromboprophylaxis within 24 hours of delivery     Encourage breastfeeding    Follow four times daily finger sticks (fasting and either one or two hours post-prandial).  Goals are <95 fasting and <140 at one hour post-prandial and/or <120 at two hours post-prandial    Rh immune globulin, if indicated, after delivery    Social work consultation    Early postpartum visit (at one week) for mood check     At the end of our discussion, Ms. Rebolledo indicated that her questions were answered and she seemed satisfied with our discussion.  Thank you for the opportunity to participate in your patient s care.  If I can be of any further assistance, please do not hesitate to contact me.    Sincerely,    Bianca Moore MD  , OB/GYN  Maternal-Fetal Medicine  [email or web address]  [phone] (Academic office)  [phone] (Pager)       I spent a total of 30 minutes face to face with Dasha Rebolledo during today's consultation.  Over 50% of this time was spent in counseling the patient and/or coordinating care.

## 2019-12-02 NOTE — PLAN OF CARE
Pt vitals stable with no temp present. No evidence of chorio. Pt feeling nauseated.  Reported tylenol that was given for her head ache has improved. Zofran given for nausea. Pt reports feeling fetal movement and movement ascultated on monitor during NST and that nausea improved. Magnesium turned off at 2330. Oral antibiotics started. Pt denies any side effects at this time. Voiding adequately. Sat and discussed with p atient her feeling overwhelmed but pt states is in good spirits. Feels confined d/t being on bedrest in room for so long. Hopeful once off mag could be up more. Tucked pt into bed.

## 2019-12-02 NOTE — PROGRESS NOTES
"HD 4  33 weeks  EDC January 20    Doing well  Good FM  Some LOF continues  No vaginal bleeding  No painful contractions     Afebrile  Normotensive    Fetal tracing ~0600 today:  Reactive NST  toco quiet    A/p    33 week para 0201  History of ~35.5-36.5 week vaginal delivery (2012)(6lb6oz baby, who was discharged from hospital on day 4 of life)  History of 23 week D&E (anomalous fetus)(2016)    PPROM November 29 PM; fetus in vertex presentation    EDC as above; established by US October 24 (perinatology, [facility]); pt was late in her presentation for prenatal care    Pt stable  She has received corticosteroids for fetal lung maturity  Magnesium sulfate course complete  Now on oral antibiotics (latency)    Disc \"endpoint\" (induction):  34 weeks (December 9)  Until that time, disc possible need for intervention if:   -intraamniotic infection occurs   -maternal or fetal status becomes unstable   -spontaneous labor ensues  Also disc risks of abruptio placentae, fetal demise (with PPROM)    Awaiting MFM input later today    Pt comfortable with keyana Cash MD  "

## 2019-12-03 ENCOUNTER — ANESTHESIA (OUTPATIENT)
Dept: OBGYN | Facility: CLINIC | Age: 34
End: 2019-12-03

## 2019-12-03 ENCOUNTER — ANESTHESIA EVENT (OUTPATIENT)
Dept: OBGYN | Facility: CLINIC | Age: 34
End: 2019-12-03

## 2019-12-03 LAB
GLUCOSE BLDC GLUCOMTR-MCNC: 109 MG/DL (ref 70–99)
GLUCOSE BLDC GLUCOMTR-MCNC: 73 MG/DL (ref 70–99)
PLATELET # BLD AUTO: 145 10E9/L (ref 150–450)

## 2019-12-03 PROCEDURE — 00000146 ZZHCL STATISTIC GLUCOSE BY METER IP

## 2019-12-03 PROCEDURE — 36415 COLL VENOUS BLD VENIPUNCTURE: CPT | Performed by: OBSTETRICS & GYNECOLOGY

## 2019-12-03 PROCEDURE — 85049 AUTOMATED PLATELET COUNT: CPT | Performed by: OBSTETRICS & GYNECOLOGY

## 2019-12-03 PROCEDURE — 12000000 ZZH R&B MED SURG/OB

## 2019-12-03 PROCEDURE — 25000132 ZZH RX MED GY IP 250 OP 250 PS 637: Performed by: OBSTETRICS & GYNECOLOGY

## 2019-12-03 RX ADMIN — AMOXICILLIN 250 MG: 250 CAPSULE ORAL at 08:00

## 2019-12-03 RX ADMIN — AMOXICILLIN 250 MG: 250 CAPSULE ORAL at 23:43

## 2019-12-03 RX ADMIN — AMOXICILLIN 250 MG: 250 CAPSULE ORAL at 19:33

## 2019-12-03 RX ADMIN — SENNOSIDES AND DOCUSATE SODIUM 1 TABLET: 8.6; 5 TABLET ORAL at 19:33

## 2019-12-03 RX ADMIN — SENNOSIDES AND DOCUSATE SODIUM 1 TABLET: 8.6; 5 TABLET ORAL at 08:00

## 2019-12-03 NOTE — PROVIDER NOTIFICATION
12/02/19 1815   Provider Notification   Provider Name/Title Dr. Marley   Method of Notification In Department   Notification Reason Status Update

## 2019-12-03 NOTE — PLAN OF CARE
VSS on RA, pt afebrile. Denies uterine ctx, bleeding, HA, blurry vision, epigastric pain. Leaking small amount of clear fluid. IV SL. Pt on bedrest with bathroom priv, voiding without difficulty. NST reactive. SCDs on. Resting comfortably through the night. Will continue to monitor per POC.

## 2019-12-03 NOTE — PLAN OF CARE
Dr Wu here to see patient. Plan to do fasting blood sugars and 2 hours post prandials going forward. Will also get a platelet count today. BP with regular sized blue cuff 155/ 82. Rechecked BP with large cuff and it is 141/ 67. No swelling, no headache, no visual disturbances reported.  1400- Pt reports a very large gush of clear fluid.  1430-blood sugar-73

## 2019-12-03 NOTE — PROGRESS NOTES
S: Feeling well today. Ongoing leakage of clear fluid. Occasional mild cramping. No vaginal bleeding. Reports good fetal movement. Denies headaches, blurred vision, RUQ pain.    O: Temp: 98.6  F (37  C) Temp src: Oral BP: (!) 141/67 Pulse: 80 Heart Rate: 83 Resp: 16 SpO2: 95 %        Gen: NAD  Abd: Soft, no fundal tenderness, gravid  Ext: NT/NE  FHT: 125/mod/+accels/no decels  [place]: none    A/P: 33 y/o  at 33w1d by 27w3d US admitted with PPROM    Fetal well being - Reassuring on TID NSTs  Growth US yesterday EFW 42%ile (4 lb 8 oz), cephalic, BPP     -Per MFM plan for repeat BPP in 2 days  Received BMTZ on  and     PPROM - stable on latency antibiotics  Continue to monitor for signs of labor, infection, abruption, fetal distress  Otherwise plan for delivery at 34 weeks    Elevated 1 hr gtt, has not completed 3 hr  Will start checking fasting and 2 hr post-prandial sugars (possible these may be elevated 2/2 recent steroid administration)    Thrombocytopenia - platelets 129 on admission; will repeat today    Elevated blood pressure - intermittent mild range blood pressures, no known history of chronic hypertension or gestational hypertension. Will continue to monitor    Rh neg - Rhogam received on 19. Rubella immune. GBS neg    Stable on antepartum    Priti Wu MD 19 1:23 PM

## 2019-12-04 LAB
BLOOD BANK CMNT PATIENT-IMP: NORMAL
BLOOD BANK CMNT PATIENT-IMP: NORMAL
GLUCOSE BLDC GLUCOMTR-MCNC: 84 MG/DL (ref 70–99)
T PALLIDUM AB SER QL: NONREACTIVE

## 2019-12-04 PROCEDURE — 36415 COLL VENOUS BLD VENIPUNCTURE: CPT | Performed by: OBSTETRICS & GYNECOLOGY

## 2019-12-04 PROCEDURE — 88307 TISSUE EXAM BY PATHOLOGIST: CPT | Mod: 26 | Performed by: OBSTETRICS & GYNECOLOGY

## 2019-12-04 PROCEDURE — 0KQM0ZZ REPAIR PERINEUM MUSCLE, OPEN APPROACH: ICD-10-PCS | Performed by: OBSTETRICS & GYNECOLOGY

## 2019-12-04 PROCEDURE — 12000035 ZZH R&B POSTPARTUM

## 2019-12-04 PROCEDURE — 25000125 ZZHC RX 250: Performed by: OBSTETRICS & GYNECOLOGY

## 2019-12-04 PROCEDURE — 00000146 ZZHCL STATISTIC GLUCOSE BY METER IP

## 2019-12-04 PROCEDURE — 25000132 ZZH RX MED GY IP 250 OP 250 PS 637: Performed by: OBSTETRICS & GYNECOLOGY

## 2019-12-04 PROCEDURE — 88307 TISSUE EXAM BY PATHOLOGIST: CPT | Performed by: OBSTETRICS & GYNECOLOGY

## 2019-12-04 PROCEDURE — 86780 TREPONEMA PALLIDUM: CPT | Performed by: OBSTETRICS & GYNECOLOGY

## 2019-12-04 PROCEDURE — 72200001 ZZH LABOR CARE VAGINAL DELIVERY SINGLE

## 2019-12-04 RX ORDER — ACETAMINOPHEN 325 MG/1
650 TABLET ORAL EVERY 4 HOURS PRN
Status: DISCONTINUED | OUTPATIENT
Start: 2019-12-04 | End: 2019-12-06 | Stop reason: HOSPADM

## 2019-12-04 RX ORDER — NALOXONE HYDROCHLORIDE 0.4 MG/ML
.1-.4 INJECTION, SOLUTION INTRAMUSCULAR; INTRAVENOUS; SUBCUTANEOUS
Status: DISCONTINUED | OUTPATIENT
Start: 2019-12-04 | End: 2019-12-06 | Stop reason: HOSPADM

## 2019-12-04 RX ORDER — CARBOPROST TROMETHAMINE 250 UG/ML
250 INJECTION, SOLUTION INTRAMUSCULAR
Status: DISCONTINUED | OUTPATIENT
Start: 2019-12-04 | End: 2019-12-06 | Stop reason: HOSPADM

## 2019-12-04 RX ORDER — IBUPROFEN 400 MG/1
800 TABLET, FILM COATED ORAL
Status: DISCONTINUED | OUTPATIENT
Start: 2019-12-04 | End: 2019-12-06 | Stop reason: HOSPADM

## 2019-12-04 RX ORDER — LANOLIN 100 %
OINTMENT (GRAM) TOPICAL
Status: DISCONTINUED | OUTPATIENT
Start: 2019-12-04 | End: 2019-12-06 | Stop reason: HOSPADM

## 2019-12-04 RX ORDER — SODIUM CHLORIDE, SODIUM LACTATE, POTASSIUM CHLORIDE, CALCIUM CHLORIDE 600; 310; 30; 20 MG/100ML; MG/100ML; MG/100ML; MG/100ML
INJECTION, SOLUTION INTRAVENOUS CONTINUOUS
Status: DISCONTINUED | OUTPATIENT
Start: 2019-12-04 | End: 2019-12-06 | Stop reason: HOSPADM

## 2019-12-04 RX ORDER — AMOXICILLIN 250 MG
1 CAPSULE ORAL 2 TIMES DAILY
Status: DISCONTINUED | OUTPATIENT
Start: 2019-12-04 | End: 2019-12-04

## 2019-12-04 RX ORDER — OXYTOCIN/0.9 % SODIUM CHLORIDE 30/500 ML
100 PLASTIC BAG, INJECTION (ML) INTRAVENOUS CONTINUOUS
Status: DISCONTINUED | OUTPATIENT
Start: 2019-12-04 | End: 2019-12-06 | Stop reason: HOSPADM

## 2019-12-04 RX ORDER — METHYLERGONOVINE MALEATE 0.2 MG/ML
200 INJECTION INTRAVENOUS
Status: DISCONTINUED | OUTPATIENT
Start: 2019-12-04 | End: 2019-12-06 | Stop reason: HOSPADM

## 2019-12-04 RX ORDER — OXYTOCIN/0.9 % SODIUM CHLORIDE 30/500 ML
340 PLASTIC BAG, INJECTION (ML) INTRAVENOUS CONTINUOUS PRN
Status: DISCONTINUED | OUTPATIENT
Start: 2019-12-04 | End: 2019-12-06 | Stop reason: HOSPADM

## 2019-12-04 RX ORDER — OXYCODONE AND ACETAMINOPHEN 5; 325 MG/1; MG/1
1 TABLET ORAL
Status: DISCONTINUED | OUTPATIENT
Start: 2019-12-04 | End: 2019-12-06 | Stop reason: HOSPADM

## 2019-12-04 RX ORDER — HYDROCORTISONE 2.5 %
CREAM (GRAM) TOPICAL 3 TIMES DAILY PRN
Status: DISCONTINUED | OUTPATIENT
Start: 2019-12-04 | End: 2019-12-06 | Stop reason: HOSPADM

## 2019-12-04 RX ORDER — IBUPROFEN 400 MG/1
800 TABLET, FILM COATED ORAL EVERY 6 HOURS PRN
Status: DISCONTINUED | OUTPATIENT
Start: 2019-12-04 | End: 2019-12-06 | Stop reason: HOSPADM

## 2019-12-04 RX ORDER — BISACODYL 10 MG
10 SUPPOSITORY, RECTAL RECTAL DAILY PRN
Status: DISCONTINUED | OUTPATIENT
Start: 2019-12-06 | End: 2019-12-06 | Stop reason: HOSPADM

## 2019-12-04 RX ORDER — AMOXICILLIN 250 MG
2 CAPSULE ORAL 2 TIMES DAILY
Status: DISCONTINUED | OUTPATIENT
Start: 2019-12-04 | End: 2019-12-04

## 2019-12-04 RX ORDER — OXYTOCIN 10 [USP'U]/ML
10 INJECTION, SOLUTION INTRAMUSCULAR; INTRAVENOUS
Status: DISCONTINUED | OUTPATIENT
Start: 2019-12-04 | End: 2019-12-06 | Stop reason: HOSPADM

## 2019-12-04 RX ORDER — ONDANSETRON 2 MG/ML
4 INJECTION INTRAMUSCULAR; INTRAVENOUS EVERY 6 HOURS PRN
Status: DISCONTINUED | OUTPATIENT
Start: 2019-12-04 | End: 2019-12-06 | Stop reason: HOSPADM

## 2019-12-04 RX ORDER — OXYTOCIN/0.9 % SODIUM CHLORIDE 30/500 ML
100-340 PLASTIC BAG, INJECTION (ML) INTRAVENOUS CONTINUOUS PRN
Status: COMPLETED | OUTPATIENT
Start: 2019-12-04 | End: 2019-12-04

## 2019-12-04 RX ADMIN — IBUPROFEN 800 MG: 400 TABLET ORAL at 05:26

## 2019-12-04 RX ADMIN — Medication 340 ML/HR: at 04:37

## 2019-12-04 RX ADMIN — IBUPROFEN 800 MG: 400 TABLET ORAL at 18:09

## 2019-12-04 RX ADMIN — ACETAMINOPHEN 650 MG: 325 TABLET, FILM COATED ORAL at 12:00

## 2019-12-04 RX ADMIN — ACETAMINOPHEN 650 MG: 325 TABLET, FILM COATED ORAL at 05:26

## 2019-12-04 RX ADMIN — IBUPROFEN 800 MG: 400 TABLET ORAL at 12:00

## 2019-12-04 RX ADMIN — ACETAMINOPHEN 650 MG: 325 TABLET, FILM COATED ORAL at 18:09

## 2019-12-04 RX ADMIN — LIDOCAINE HYDROCHLORIDE 20 ML: 10 INJECTION, SOLUTION INFILTRATION; PERINEURAL at 04:39

## 2019-12-04 NOTE — PLAN OF CARE
Patient called RN @0215 reporting light pink discharge and woke up to feeling contractions. Pt put on monitor for one hour, 4 contractions in the hour ranging from 4 to 12 minutes, reporting pain 5/10. Patient encouraged to call RN if contractions increased in frequency.     @0345 patient reporting contractions increase in frequency and intensity. SVE 7/90/-1. Dr. Schwab called for delivery and Dr. Li (in house) called for immediate assessment. Dr. Schwab at bedside, SVE 10cm by physician, pt instructed to push.     Spontaneous vaginal delivery @0434. Dr. Schwab, RN, and NNP at bedside for delivery, see delivery summary for details. IV pitocin infusing.

## 2019-12-04 NOTE — PROCEDURES
OB Vaginal Delivery Note    Dasha Rebolledo MRN# [id number]   Age: 34 year old YOB: 1985       GA: 33w2d  GP:   Labor Complications:PPROM at 32 weeks 5 day  Prolonged PROM (>18 hours)   EBL: 50  mL  Delivery QBL:    Delivery Type: Vaginal, Spontaneous   ROM to Delivery Time: (Delivered) Days: 4 Hours: 8 Minutes: 4   Weight:      1 Minute 5 Minute 10 Minute   Apgar Totals:                     Delivery Details:  Dasha Rebolledo, a 34 year old  female delivered a vigorous female infant delivered in vertex  left  occiput  anterior  position. Anterior and posterior shoulders delivered without difficulty. The cord was clamped, cut twice and    3 vessels were noted. Cord blood was obtained in routine fashion.    Cord complications: none.  Placenta delivered spontaneously and intact. Placental disposition was Pathology . Fundal massage performed and fundus found to be firm.     Episiotomy: none    Perineum, vagina, cervix were inspected, and the following lacerations were noted:   Perineal lacerations: 2nd                     Any lacerations were repaired in the usual fashion using 3-0 vicryl    Excellent hemostasis was noted. Needle count correct. Infant and patient in delivery room in good and stable condition.        Rupture date/time: 19   Fluid color:  Clear, Pink, Other (comment)  Fluid odor:  Normal     Delivery/Placenta Date and Time    Delivery Date:  19 Delivery Time:   4:34 AM      Labor Events and Shoulder Dystocia    Fetal Tracing Prior to Delivery:  Category 1  Shoulder dystocia present?:  Neg             Delivery (Maternal) (Provider to Complete) (135849)    Episiotomy:  None  Perineal lacerations:  2nd Repaired?:  Yes   Est. blood loss (mL):  50  Number of repair packets:  1     Blood Loss  Mother: Dasha Rebolledo #[id number]   Start of Mother's Information    IO Blood Loss  19 1634 - 19 0456    EBL (mL) Hospital Encounter 50 mL    Total  50 mL         End  of Mother's Information  Mother: Dasha Rebolledo #[id number]         Delivery - Provider to Complete ([id number])    Delivering clinician:  Schwab, Jennifer Lani, MD  Attempted Delivery Types (Choose all that apply):  Spontaneous Vaginal Delivery  Delivery Type (Choose the 1 that will go to the Birth History):  Vaginal, Spontaneous          Placenta    Delayed Cord Clamping:  Done  Removal:  Spontaneous  Disposition:  Pathology     Presentation and Position    Presentation:  Vertex  Position:  Left Occiput Anterior           Jennifer L. Schwab, MD

## 2019-12-04 NOTE — LACTATION NOTE
Initial visit with Dasha.  Baby in NICU.  Baby  33+2 gestation.     Breastfeeding general information reviewed.   Advised to pump and hand express 8-12x/day. When able to hold baby, encouraged lots of skin to skin. Explained benefits of holding and skin to skin. Instructed on hand expression. Patient is from [place] and will seek out  when home.  [facility] resource phone numbers given.  Has a breast pump for home.  Questions answered regarding pumping and physiology of milk supply and production.  Dasha thanked  for all the help and information.    Continues to nurse well per mom. No further questions at this time.   Will follow as needed.   Fatuma CARTERN, RN, PHN, RNC-MNN, IBCLC

## 2019-12-04 NOTE — PROVIDER NOTIFICATION
12/04/19 0406   Provider Notification   Provider Name/Title Dr. Schwab   Method of Notification Phone   Request Attend Delivery   Notification Reason SVE       Dr. Schwab called updated on pt status SVE.  Request to come for delivery.

## 2019-12-04 NOTE — PROGRESS NOTES
S: Feeling well. Pain controlled. Lochia normal. Denies headaches, blurred vision, edema. Pumping for , who is apparently doing well in the NICU.     O: Temp: 97.7  F (36.5  C) Temp src: Temporal BP: 121/59   Heart Rate: 67 Resp: 16        Gen: NAD  Abd: Soft, NT, fundus firm  Ext: NT/NE    Component      Latest Ref Rng & Units 2019   Glucose      70 - 99 mg/dL 84     A/P: 33 y/o  PPD#0 s/p  after PPROM/PTL at 33w2d    -Recovering appropriately postpartum  -Blood sugars normal, no formal diagnosis of GDM. May discontinue accuchecks  -Gestational hypertension - occasional mild range blood pressures. Not on anti-hypertensive medication. Continue to monitor  -Rh neg - Administer Rhogam per protocol. Rubella immune    Anticipate discharge PPD#2    Priti Wu MD 19 1:23 PM  -

## 2019-12-04 NOTE — PLAN OF CARE
Patient transferred to room 424 at 0900. Patient oriented to postpartum. Patient pumping q3hrs, visiting infant in NICU. Patient taking Tylenol and Ibuprofen for pain with adequate pain relief. Patient ambulating independently, voiding without difficulty, IV discontinued. Encouraged patient to call with needs/questions. Call light with reach, will continue to monitor.

## 2019-12-05 LAB — COPATH REPORT: NORMAL

## 2019-12-05 PROCEDURE — 25000132 ZZH RX MED GY IP 250 OP 250 PS 637: Performed by: OBSTETRICS & GYNECOLOGY

## 2019-12-05 PROCEDURE — 12000035 ZZH R&B POSTPARTUM

## 2019-12-05 RX ADMIN — ACETAMINOPHEN 650 MG: 325 TABLET, FILM COATED ORAL at 19:39

## 2019-12-05 RX ADMIN — IBUPROFEN 800 MG: 400 TABLET ORAL at 19:39

## 2019-12-05 RX ADMIN — SENNOSIDES AND DOCUSATE SODIUM 1 TABLET: 8.6; 5 TABLET ORAL at 20:24

## 2019-12-05 RX ADMIN — IBUPROFEN 800 MG: 400 TABLET ORAL at 07:07

## 2019-12-05 RX ADMIN — ACETAMINOPHEN 650 MG: 325 TABLET, FILM COATED ORAL at 00:30

## 2019-12-05 RX ADMIN — SENNOSIDES AND DOCUSATE SODIUM 1 TABLET: 8.6; 5 TABLET ORAL at 08:40

## 2019-12-05 RX ADMIN — IBUPROFEN 800 MG: 400 TABLET ORAL at 00:30

## 2019-12-05 RX ADMIN — ACETAMINOPHEN 650 MG: 325 TABLET, FILM COATED ORAL at 07:07

## 2019-12-05 RX ADMIN — IBUPROFEN 800 MG: 400 TABLET ORAL at 13:35

## 2019-12-05 RX ADMIN — ACETAMINOPHEN 650 MG: 325 TABLET, FILM COATED ORAL at 13:35

## 2019-12-05 NOTE — PROGRESS NOTES
"BRIEF NUTRITION ASSESSMENT      REASON FOR ASSESSMENT:  Dasha Rebolledo is a 34 year old female assessed by Registered Dietitian for LOS protocol      CURRENT DIET AND INTAKE:  Diet:  Regular              Chart reviewed  Pt tolerating po  Delivered baby yesterday  Anticipate discharge tomorrow      ANTHROPOMETRICS:  Height: 5' 3\"  Weight:(12/2) 108.3 kg /  238 lbs 12.8 oz    LABS:  Labs noted    MALNUTRITION:  Patient does not meet two of the following criteria necessary for diagnosing malnutrition: significant weight loss, reduced intake, subcutaneous fat loss, muscle loss or fluid retention.     NUTRITION INTERVENTION:  Nutrition Diagnosis:  No nutrition diagnosis at this time.    Implementation:  Nutrition Education ---> no education needs identified     FOLLOW UP/MONITORING:   Will re-evaluate in 7 - 10 days, or sooner, if re-consulted.          "

## 2019-12-05 NOTE — PLAN OF CARE
Fundus firm and bleeding wnl.  VSS.  Voiding without difficulty.  Taking tylenol and ibuprofen every 6 hrs with good relief.  Up independently.  Using ice and tucks.  Encouraged to call with questions or concerns.  Babe in the NICU. Pt pumping every 3 hours. Visiting NICU in the evening and sleeping tonight between pumping sessions. Getting drops. Will continue to monitor.

## 2019-12-05 NOTE — CONSULTS
CARIDAD  D: SW consulted due to baby being born prematurely.    I: SW met with patient.  Patient states she was in [place] visiting family for Thanksgiving and went into premature labor and delivered at [facility].  Mother, father, older brother, and baby live in [place].  Mother will be staying with her uncle who lives in the [facility] while baby is in NICU and will then to back to [place] with baby.  Pt  and her older son are here until early next week and will then go back to their home.  Patient does not report any financial or emotional stressors at this time.   A: Mother is happily watching her mother and father hold and care for baby in NICU.  P: SW to follow.  Cathy LE

## 2019-12-05 NOTE — PROGRESS NOTES
[facility]   Post-Partum Progress Note          Assessment and Plan:    Assessment:   Post-partum day #1  Normal spontaneous vaginal delivery  PPROM  L&D complications: PPROM  Intrauterine pregnancy at 33 2/7 weeks gestation      Doing well.  Pain well-controlled.      Plan:   Ambulation encouraged  Breast feeding strategies discussed  Anticipate discharge tomorrow           Interval History:   Doing well.  Pain is well-controlled.  No fevers.  No history of foul-smelling vaginal discharge.  Good appetite.  Denies chest pain, shortness of breath, nausea or vomiting.  Vaginal bleeding is similar to a heavy menstrual flow.  Ambulatory.  Breastfeeding well.          Significant Problems:      Past Medical History:   Diagnosis Date     Anxiety      Depressive disorder      Migraines      Obesity      Thrombocytopenia (H)              Review of Systems:    The patient denies any chest pain, shortness of breath, excessive pain, fever, chills, purulent drainage from the wound, nausea or vomiting.          Medications:     All medications related to the patient's surgery have been reviewed  Current Facility-Administered Medications   Medication     acetaminophen (TYLENOL) tablet 650 mg     acetaminophen (TYLENOL) tablet 650 mg     acetaminophen (TYLENOL) tablet 975 mg     [START ON 12/6/2019] bisacodyl (DULCOLAX) Suppository 10 mg     Blood Bank will determine if patient is eligible for and the proper dosage of Rho (D) immune globulin (RhoGam)     calcium gluconate 10 % injection 1 g     carboprost (HEMABATE) injection 250 mcg     hydrocortisone 2.5 % cream     ibuprofen (ADVIL/MOTRIN) tablet 800 mg     ibuprofen (ADVIL/MOTRIN) tablet 800 mg     lactated ringers BOLUS 1,000 mL    Or     lactated ringers BOLUS 500 mL     lactated ringers BOLUS 1,000 mL     lactated ringers BOLUS 500 mL     lactated ringers infusion     lactated ringers infusion     lanolin ointment     magnesium sulfate infusion      "Medication Instructions: misoprostol (CYTOTEC)- Nurse to discuss ordering with provider, if needed. Ordered via \"OB misoprostol (CYTOTEC) Postpartum Hemorrhage PANEL\"     methylergonovine (METHERGINE) injection 200 mcg     naloxone (NARCAN) injection 0.1-0.4 mg     naloxone (NARCAN) injection 0.1-0.4 mg     No MMR Needed - Assessment: Patient does not need MMR vaccine     NO Rho (D)  immune globulin (RhoGam) needed  - mother INELIGIBLE     No Tdap Needed - Assessment: Patient does not need Tdap vaccine     ondansetron (ZOFRAN) injection 4 mg     ondansetron (ZOFRAN) injection 4 mg     oxyCODONE-acetaminophen (PERCOCET) 5-325 MG per tablet 1 tablet     oxytocin (PITOCIN) 30 units in 500 mL 0.9% NaCl infusion     oxytocin (PITOCIN) 30 units in 500 mL 0.9% NaCl infusion     oxytocin (PITOCIN) injection 10 Units     oxytocin (PITOCIN) injection 10 Units     senna-docusate (SENOKOT-S/PERICOLACE) 8.6-50 MG per tablet 1 tablet    Or     senna-docusate (SENOKOT-S/PERICOLACE) 8.6-50 MG per tablet 2 tablet     sodium chloride (PF) 0.9% PF flush 3 mL     sodium chloride (PF) 0.9% PF flush 3 mL     [START ON 2019] sodium phosphate (FLEET ENEMA) 1 enema     Tdap (tetanus-diphtheria-acell pertussis) (ADACEL) injection 0.5 mL     tranexamic acid (CYKLOKAPRON) Bolus 1g vial attach to NaCl 50 or 100 mL bag ADULT             Physical Exam:   All vitals stable  Temp: 97.6  F (36.4  C) Temp src: Oral BP: 130/77 Pulse: 74 Heart Rate: 100 Resp: 16        Blood pressure range: Systolic (24hrs), Av , Min:114 , Max:136   Blood pressure range: Diastolic (24hrs), Av, Min:71, Max:81  Uterine fundus is firm, non-tender and at the level of the umbilicus          Data:     All laboratory data related to this surgery reviewed  Hemoglobin   Date Value Ref Range Status   2019 12.7 11.7 - 15.7 g/dL Final     No imaging studies have been ordered  BP range has been labile, borderline elevated since delivery. Continue to monitor. "     Nishi Hood MD, MD

## 2019-12-06 VITALS
TEMPERATURE: 97.5 F | BODY MASS INDEX: 42.31 KG/M2 | HEART RATE: 93 BPM | HEIGHT: 63 IN | RESPIRATION RATE: 16 BRPM | DIASTOLIC BLOOD PRESSURE: 77 MMHG | WEIGHT: 238.8 LBS | SYSTOLIC BLOOD PRESSURE: 120 MMHG | OXYGEN SATURATION: 95 %

## 2019-12-06 PROCEDURE — 25000132 ZZH RX MED GY IP 250 OP 250 PS 637: Performed by: OBSTETRICS & GYNECOLOGY

## 2019-12-06 RX ORDER — LANOLIN 100 %
OINTMENT (GRAM) TOPICAL
Qty: 30 G | Refills: 0 | COMMUNITY
Start: 2019-12-06

## 2019-12-06 RX ORDER — DOCUSATE SODIUM 100 MG/1
100 CAPSULE, LIQUID FILLED ORAL 2 TIMES DAILY PRN
Refills: 0 | COMMUNITY
Start: 2019-12-06

## 2019-12-06 RX ORDER — IBUPROFEN 200 MG
600 TABLET ORAL EVERY 6 HOURS PRN
COMMUNITY
Start: 2019-12-06

## 2019-12-06 RX ORDER — ACETAMINOPHEN 500 MG
1000 TABLET ORAL EVERY 6 HOURS PRN
COMMUNITY
Start: 2019-12-06

## 2019-12-06 RX ORDER — SERTRALINE HYDROCHLORIDE 100 MG/1
100 TABLET, FILM COATED ORAL DAILY
Qty: 30 TABLET | Refills: 1 | Status: SHIPPED | OUTPATIENT
Start: 2019-12-06

## 2019-12-06 RX ADMIN — IBUPROFEN 800 MG: 400 TABLET ORAL at 02:38

## 2019-12-06 RX ADMIN — IBUPROFEN 800 MG: 400 TABLET ORAL at 09:18

## 2019-12-06 RX ADMIN — SENNOSIDES AND DOCUSATE SODIUM 1 TABLET: 8.6; 5 TABLET ORAL at 09:18

## 2019-12-06 RX ADMIN — ACETAMINOPHEN 650 MG: 325 TABLET, FILM COATED ORAL at 02:39

## 2019-12-06 RX ADMIN — ACETAMINOPHEN 650 MG: 325 TABLET, FILM COATED ORAL at 09:18

## 2019-12-06 NOTE — PLAN OF CARE
Vital signs stable. Up ad griselda. Voiding without difficulties. Bleeding wnl. Using ice and tucks. Taking tylenol and ibuprofen for pain control. Pumping every three hours. Ambulating to nicu to see infant. Encouraged to call with any questions or concerns. Will continue to monitor.

## 2019-12-06 NOTE — LACTATION NOTE
Attempted follow up Lactation visit. Dasha not in room, in NICU with baby. Patient planning to discharge today. Pumping. Will attempt follow up visit or try to see in NICU.    Sasha Shelley, RN-C, Lactation Educator

## 2019-12-06 NOTE — PLAN OF CARE
Fundus firm and bleeding wnl.  VSS.  Voiding without difficulty.  Taking tylenol and ibuprofen every 6 hrs with good relief.  Up independently.  Using ice and tucks.  Pumping every 3 hours for babe in the NICU. Getting drops. Adding in hand expression after pumping. Encouraged to call with questions or concerns.  Will continue to monitor.

## 2019-12-06 NOTE — PROGRESS NOTES
"Doing well. Discussed plan for discharge today. She plans to stay with her uncle while baby is in NICU.   Pumping milk.  Requests to restart medication for anxiety.    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 Contact information given for [facility] if she needs anything in the meantime.  Will start Zoloft 50 mg daily x 1 week, then 100 mg daily    Jennifer L. Schwab, MD    "

## 2019-12-06 NOTE — DISCHARGE SUMMARY
[facility] Discharge Summary    Dasha Rebolledo MRN# [id number]   Age: 34 year old YOB: 1985     Date of Admission:  2019  Date of Discharge::  2019  Admitting Physician:  Jennifer Lani Schwab, MD  Discharge Physician:  Jennifer L. Schwab, MD     Home clinic: [facility]          Admission Diagnoses:   PPROM at 32 weeks 5 days          Discharge Diagnosis:   PPROM at 32 weeks 5 days    labor and delivery at 33 weeks 2 days         Procedures:   Procedure(s): Spontaneous vaginal delivery and repair of second degree laceration              Medications Prior to Admission:     Medications Prior to Admission   Medication Sig Dispense Refill Last Dose     Prenatal Vit-Fe Fumarate-FA (PNV PRENATAL PLUS MULTIVITAMIN) 27-1 MG TABS per tablet Take 1 tablet by mouth daily   2019 at Unknown time     [DISCONTINUED] aspirin (ASA) 81 MG chewable tablet Take 81 mg by mouth daily   2019 at Unknown time     [DISCONTINUED] diphenhydrAMINE HCl (BENADRYL PO) Take 25 mg by mouth as needed   2019 at 1930             Discharge Medications:     Current Discharge Medication List      START taking these medications    Details   acetaminophen (TYLENOL) 500 MG tablet Take 2 tablets (1,000 mg) by mouth every 6 hours as needed for mild pain    Associated Diagnoses:  labor with  delivery, fetus 1      docusate sodium (COLACE) 100 MG capsule Take 1 capsule (100 mg) by mouth 2 times daily as needed for constipation  Refills: 0    Associated Diagnoses:  labor with  delivery, fetus 1      ibuprofen (ADVIL/MOTRIN) 200 MG tablet Take 3 tablets (600 mg) by mouth every 6 hours as needed for moderate pain (mild-moderate pain)    Associated Diagnoses:  labor with  delivery, fetus 1      lanolin ointment Apply topically every hour as needed for dry skin (sore nipples)  Qty: 30 g, Refills: 0    Associated Diagnoses:  labor with  delivery,  fetus 1      !! sertraline (ZOLOFT) 100 MG tablet Take 1 tablet (100 mg) by mouth daily  Qty: 30 tablet, Refills: 1    Comments: Start after taking 50 mg daily x 1 week  Associated Diagnoses:  labor with  delivery, fetus 1      !! sertraline (ZOLOFT) 50 MG tablet Take 1 tablet (50 mg) by mouth daily  Qty: 7 tablet, Refills: 0    Associated Diagnoses:  labor with  delivery, fetus 1       !! - Potential duplicate medications found. Please discuss with provider.      CONTINUE these medications which have NOT CHANGED    Details   Prenatal Vit-Fe Fumarate-FA (PNV PRENATAL PLUS MULTIVITAMIN) 27-1 MG TABS per tablet Take 1 tablet by mouth daily         STOP taking these medications       aspirin (ASA) 81 MG chewable tablet Comments:   Reason for Stopping:         diphenhydrAMINE HCl (BENADRYL PO) Comments:   Reason for Stopping:                     Consultations:   Consultation during this admission received from NICU          Brief History of Labor:   35 y/o  at 32w5d by 27w3d US, admitted with PPROM   -Received Magnesium for tocolysis while administering 2 doses of betamethasone for fetal lung maturity. Received IV and then oral antibiotics per protocol for latency. US on 2019 showed oligohydramnios, EFW 4 lb 8 oz (42%).     At approximately 4 am on 2019, she went into  labor with painful contractions and was found to be 7-8 cm dilated. She quickly progressed to fully dilated and pushed to deliver a vigorous female infant with APGARs 8 and 9, weight 2100 gram. A small second degree laceration was repaired.           Hospital Course:   The patient's hospital course was unremarkable.  On discharge, her pain was well controlled. Vaginal bleeding is similar to peak menstrual flow.  Voiding without difficulty.  Ambulating well and tolerating a normal diet.  No fever.  Breastfeeding/ pumping well.  Infant is stable in NICU.  No bowel movement yet.  She was discharged on  post-partum day #2.    Restarted on Zoloft postpartum.    Post-partum hemoglobin:   Hemoglobin   Date Value Ref Range Status   11/29/2019 12.7 11.7 - 15.7 g/dL Final             Discharge Instructions and Follow-Up:   Discharge diet: Regular   Discharge activity: Pelvic rest: abstain from intercourse and do not use tampons for 6 week(s)   Discharge follow-up: Follow up with primary care provider in 6 weeks   Wound care: Drink plenty of fluids  Ice to area for comfort  Keep wound clean and dry           Discharge Disposition:   Discharged to home      Attestation:  I have reviewed today's vital signs, notes, medications, labs and imaging.  Amount of time performed on this progress note and discharge summary: 30 minutes.    Jennifer L. Schwab, MD

## 2019-12-06 NOTE — PLAN OF CARE
Vitals stable. Feels well. Oral pain medications working well. Pumping and getting small amounts of colostrum. Ready for discharge.

## 2019-12-16 ENCOUNTER — LACTATION ENCOUNTER (OUTPATIENT)
Age: 34
End: 2019-12-16

## 2019-12-16 NOTE — LACTATION NOTE
This note was copied from a baby's chart.  Check-in Lactation visit per Dasha's request. She has noted a decrease in milk supply overnight with a hard, tender spot on the outside of her left breast. Nursing gave her warm packs. She has been pumping every 3 hours during the day and every 4 hours at night. No changes in clothing or bras recently. On exam, noted tender lumpy area on left breast but no redness at skin level.    Reviewed signs/symptoms of mastitis and encouraged calling OB if she notices symptoms. She denies any symptoms at this time or any fever. No pain except for tenderness at left breast. Encouraged continued pumping as scheduled. Encouraged pumping left side more frequently if able and hands on pumping with massage. Encouraged continued use of heat prior to pumping. Encouraged warm shower with massage, warm breast soaks as able. Clean basin given. Dasha appreciative of visit. Will revisit as needed.    Sasha Shelley RN-C, Lactation Educator

## 2022-03-10 ENCOUNTER — APPOINTMENT (OUTPATIENT)
Dept: URGENT CARE | Age: 37
End: 2022-03-10

## 2022-03-11 ENCOUNTER — TELEPHONE (OUTPATIENT)
Dept: SCHEDULING | Age: 37
End: 2022-03-11

## 2022-03-12 ENCOUNTER — APPOINTMENT (OUTPATIENT)
Dept: URGENT CARE | Age: 37
End: 2022-03-12